# Patient Record
Sex: FEMALE | Race: WHITE
[De-identification: names, ages, dates, MRNs, and addresses within clinical notes are randomized per-mention and may not be internally consistent; named-entity substitution may affect disease eponyms.]

---

## 2019-09-28 NOTE — ADMISSION PHYSICAL
=================================================================



=================================================================

Datetime Report Generated by CPN: 2019 15:56

   

   

=================================================================

CURRENT ADMISSION

=================================================================

   

Chief Complaint:  Uterine Contractions

Indication for Induction:  Not Applicable

Admit Impression :  Term, Intrauterine Pregnancy; Active Labor

Admit Plan:  Admit to Unit; Initiate Labor Protocol

   

=================================================================

ALLERGIES

=================================================================

   

Medication Allergies:  No

Medication Allergies:  shellfish derived/SV/Anaphylaxis (2019)

Latex:  No Latex Allergies

Food Allergies:  Shellfish--anaphylaxis

Environmental Allergies:  Seasonal

   

=================================================================

OBSTETRICAL HISTORY

=================================================================

   

EDC:  10/07/2019 00:00

:  1

Para:  0

Term:  0

:  0

SAB:  0

IAB:  0

Ectopic:  0

Livin

Cesareans:  0

VBACs:  0

Multiple Births:  0

Gestational Diabetes:  No

Rh Sensitization:  No

Incompetent Cervix:  No

CHRISTEN:  No

Infertility:  No

ART Treatment:  No

Uterine Anomaly:  No

IUGR:  No

Hx Previous C/S:  No

Macrosomia:  No

Hx Loss/Stillborn:  No

PIH:  No

Hx  Death:  No

Placenta Previa/Abruption:  No

Depression/PP Depression:  No

PTL/PROM:  No

Post Partum Hemorrhage:  No

Current Pregnancy Procedures:  Ultrasound

Obstetrical History Comments:  G1 current

   

=================================================================

***SEE PRENATAL RECORDS***

=================================================================

   

Alcohol:  No

Marijuana :  No

Cocaine:  No

Other Illicit Drugs:  No

Cigarettes:  Never Smoker. [id number]

   

=================================================================

MEDICAL HISTORY

=================================================================

   

Diabetes:  No

Blood Transfusion:  No

Pulmonary Disease (Asthma, TB):  Yes

Breast Disease:  No

Hypertension:  No

Gyn Surgery:  No

Heart Disease:  No

Hosp/Surgery:  No

Autoimmune Disorder:  No

Anesthetic Complications:  No

Kidney Disease:  No

Abnormal Pap Smear:  No

Neuro/Epilepsy:  No

Psychiatric Disorders:  No

Other Medical Diseases:  No

Hepatitis/Liver Disease:  No

Significant Family History:  No

Varicosities/Phlebitis:  No

Trauma/Violence :  No

Thyroid Dysfunction:  No

Medical History Comments:  pt with R hip abnormality

   

=================================================================

INFECTIOUS HISTORY

=================================================================

   

Gonorrhea:  No

Genital Herpes:  No

Chlamydia:  No

Tuberculosis:  No

Syphilis:  No

Hepatitis:  No

HIV/AIDS Exposure:  No

Rash or Viral Illness:  No

HPV:  No

   

=================================================================

PHYSICAL EXAM

=================================================================

   

General:  Normal

HEENT:  Normal

Neurologic:  Normal

Thyroid:  Normal

Heart:  Normal

Lungs:  Normal

Breast:  Deferred

Back:  Normal

Abdomen:  Normal

Genitourinary Exam:  Normal

Extremities:  Normal

DTRs:  Normal

Pelvic Type:  Adequate

Vital Signs:  Reviewed

   

=================================================================

VAGINAL EXAM

=================================================================

   

Dilatation:  4

Effacement:  90

Station:  -1

   

=================================================================

MEMBRANES

=================================================================

   

Membranes:  Intact

   

=================================================================

FETUS A

=================================================================

   

EGA:  38.5

Monitoring:  External US

FHR- Baseline:  120

Variability:  Moderate 6-25bpm

Decelerations:  None

FHR Category:  Category I

Admit Comment:  admit and may have epidural

   

=================================================================

INFORMED CONSENT

=================================================================

   

Signature:  Electronically signed by Fanny Eastman MD ([facility]IDA) on

   2019 at 15:55  with User ID: [email or web address]

## 2019-09-29 NOTE — DELIVERY SUMMARY
=================================================================

Del Sum A-C

=================================================================

Datetime Report Generated by CPN: 2019 00:22

   

   

=================================================================

DELIVERY PERSONNEL

=================================================================

   

DELIVERY PERSONNEL:  [id number]

Delivery Doctor::  Fanny Eastman MD

Labor and Delivery Nurse::  Iram Petty, RN

Labor and Delivery Nurse::  Daniela Fox, SABI

   

=================================================================

MATERNAL INFORMATION

=================================================================

   

Delivery Anesthesia:  Epidural

Medications After Delivery:  Pitocin Drip 20 Units/1000ml NSS; Cytotec

   1000mcg Per Rectum/Vagina

Delivery QBL:  50

Maternal Complications:  None

   

=================================================================

LABOR SUMMARY

=================================================================

   

EDC:  10/07/2019 00:00

No. Babies in Womb:  1

 Attempted:  No

Labor Anesthesia:  Epidural

   

=================================================================

LABOR INFORMATION

=================================================================

   

Reason for Induction:  Not Applicable

Onset of Labor:  2019 11:30

Complete Dilatation:  2019 20:49

Oxytocin:  N/A

Group B Beta Strep:  negative

Antibiotics # of Doses:  0

Antibiotics Time of Last Dose:  N/A

Name of Antibiotic Given:  N/A

Steroids Given:  None

Reason Steroids Not Administered:  Not Applicable

   

=================================================================

MEMBRANES

=================================================================

   

Membranes Rupture Method:  Artificial

Rupture of Membranes:  2019 18:02

Length of Rupture (hr):  3.08

Amniotic Fluid Color:  Clear

Amniotic Fluid Amount:  Large

Amniotic Fluid Odor:  Normal

   

=================================================================

STAGES OF LABOR

=================================================================

   

Stage 1 hr:  9

Stage 1 min:  19

Stage 2 hr:  0

Stage 2 min:  18

Stage 3 hr:  0

Stage 3 min:  10

Total Time in Labor hr:  9

Total Time in Labor min:  47

   

=================================================================

VAGINAL DELIVERY

=================================================================

   

Episiotomy:  None

Laceration #1:  Vaginal

Laceration Extension #1:  First Degree

Laceration #2:  Periurethral

Laceration Extension #2:  First Degree

Laceration #3:  None

Laceration Extension #3:  N/A

Laceration Repair:  Yes

Laceration Repair Note:  a small vaginal  lac and small perineal lac

   repaired with one 3-0 chromic suture

Sponge Count Correct:  N/A

Sharps Count Correct:  N/A

   

=================================================================

CSECTION DELIVERY

=================================================================

   

Primary Indication:  N/A

Secondary Indication:  N/A

CSection Incidence:  N/A

Labor:  N/A

Elective:  N/A

   

=================================================================

BABY A INFORMATION

=================================================================

   

Infant Delivery Date/Time:  2019 21:07

Method of Delivery:  Vaginal

Born in Route :  No

:  N/A

Forceps:  N/A

Vacuum Extraction:  N/A

Shoulder Dystocia :  No

   

=================================================================

PRESENTATION/POSITION BABY A

=================================================================

   

Presentation:  Cephalic

Cephalic Presentation:  Vertex

Vertex Position:  Right Occipital Anterior

Breech Presentation:  N/A

   

=================================================================

PLACENTA INFORMATION BABY A

=================================================================

   

Placenta Delivery Time :  2019 21:17

Placenta Method of Delivery:  Spontaneous

Placenta Status:  Delivered

   

=================================================================

APGAR SCORES BABY A

=================================================================

   

Heart Rate 1 min:  >100 bpm

Resp Effort 1 min:  Good Cry

Reflex Irritability 1 min:  Cough or Sneeze or Pulls Away

Muscle Tone 1 min:  Active Motion

Color 1 min:  Blue/Pale

APGAR SCORE 1 MIN:  8

Heart Rate 5 min:  >100 bpm

Resp Effort 5 min:  Good Cry

Reflex Irritability 5 min:  Cough or Sneeze or Pulls Away

Muscle Tone 5 min:  Active Motion

Color 5 min:  Body Pink, Extremities Blue

Resuscitation Effort 5 min:  Tactile Stimulation

APGAR SCORE 5 MIN:  9

   

=================================================================

INFANT INFORMATION BABY A

=================================================================

   

Gestational Age at Delivery:  38.5

Gestational Status:  Early Term- 37- 38.6 Weeks

Infant Outcome :  Liveborn

Infant Condition :  Stable

Infant Sex:  Female

   

=================================================================

IDENTIFICATION BABY A

=================================================================

   

ID Band Number:  [id number]

Mother's Name Verified:  Yes

Infant Medical Record Number:  [id number]

RN Verifying Infant:  CHRISS foxShaina RN

Additional Verifying Personnel:  SUSI Orta RN

   

=================================================================

WEIGHT/LENGTH BABY A

=================================================================

   

Infant Birthweight (gm):  3175

Infant Weight (lb):  7

Infant Weight (oz):  0

Infant Length (in):  19.00

Infant Length (cm):  48.26

   

=================================================================

CORD INFORMATION BABY A

=================================================================

   

No. Cord Vessels:  3

Nuchal Cord :  N/A

Cord Blood Taken:  Yes-For Storage (Mom's Blood type +)

Infant Suction:  Mouth; Nose

   

=================================================================

ASSESSMENT BABY A

=================================================================

   

Infant Complications:  None

Infant Complications- Other:  Left compound hand/arm

Physical Findings at Delivery:  Within Normal Limits

Physical Findings- Other:  see initial nursery assessment

Infant Respirations:  Appears Normal

Skin to Skin:  Yes

Skin to Skin Time (min):  60

Neonatologist/ALS Called :  No

Infant Care By:  NUSRAT Fox RN

Transferred To:  Remains with Mother

   

=================================================================

BABY B INFORMATION

=================================================================

   

 :  N/A

   

=================================================================

SIGNATURES

=================================================================

   

Signature:  Electronically signed by Damain Eastman, MD ([facility]IDA) on

   2019 at 21:41  with User ID: [id number]

## 2019-09-29 NOTE — PDOC PROGRESS REPORT
Subjective-OB


Progress Note for:: 09/29/19


Subjective: 





Doing well, no c/o, ambulating, bonding with baby





Physical Exam (OB)


Vital Signs: 


                                        











Temp Pulse Resp BP Pulse Ox


 


 98.2 F   69   16   121/78   98 


 


 09/29/19 07:26  09/29/19 07:26  09/29/19 07:26  09/29/19 07:26  09/29/19 07:26








                                 Intake & Output











 09/28/19 09/29/19 09/30/19





 06:59 06:59 06:59


 


Weight  87.8 kg 














- PIH/Pre-Eclampsia


Clonus: Negative


Headache: Absent


Epigastric Pain: No


Visual Changes: No





- Lochia


Lochia Amount: Small 10-25 ml


Lochia Color: Rubra/Red





- Abdomen


Description: Soft


Hernia Present: No


Fundal Description: Firm


Fundal Height: u/u - u/2





Objective-Diagnostic


Laboratory: 


                                        





                                 09/29/19 05:43 





                                        











  09/28/19 09/28/19 09/28/19





  13:50 14:43 14:43


 


WBC   8.3 


 


RBC   4.21 


 


Hgb   13.3 


 


Hct   38.0 


 


MCV   90 


 


MCH   31.5 


 


MCHC   34.9 


 


RDW   13.3 


 


Plt Count   144 L 


 


Seg Neutrophils %   74.7 


 


Urine Color  RED  


 


Urine Appearance  CLOUDY  


 


Urine pH  6.0  


 


Ur Specific Gravity  1.019  


 


Urine Protein  100 H  


 


Urine Glucose (UA)  50 H  


 


Urine Ketones  TRACE H  


 


Urine Blood  LARGE H  


 


Urine Nitrite  NEGATIVE  


 


Ur Leukocyte Esterase  NEGATIVE  


 


Blood Type    A POSITIVE


 


Antibody Screen    NEGATIVE














  09/29/19





  05:43


 


WBC  10.9 H


 


RBC  4.06


 


Hgb  12.8


 


Hct  36.8


 


MCV  91


 


MCH  31.6


 


MCHC  34.8


 


RDW  13.8


 


Plt Count  134 L


 


Seg Neutrophils % 


 


Urine Color 


 


Urine Appearance 


 


Urine pH 


 


Ur Specific Gravity 


 


Urine Protein 


 


Urine Glucose (UA) 


 


Urine Ketones 


 


Urine Blood 


 


Urine Nitrite 


 


Ur Leukocyte Esterase 


 


Blood Type 


 


Antibody Screen 














Assessment and Plan(PN)





- Assessment and Plan


(1) Delivery normal


Is this a current diagnosis for this admission?: Yes   





- Time Spent with Patient


Time with patient: Less than 15 minutes


Medications reviewed and adjusted accordingly: Yes





- Disposition


Anticipated Discharge: Home


Within: within 24 hours

## 2019-09-30 NOTE — PDOC DISCHARGE SUMMARY
Final Diagnosis


Discharge Date: 09/30/19 - PP Day #2, doing well, no complaints, A+, Rubella 

Non-Immune, breastfeeding. 





- Final Diagnosis


(1) Normal postpartum course


Is this a current diagnosis for this admission?: Yes   





(2) Delivery normal


Is this a current diagnosis for this admission?: Yes   





Discharge Data





- Discharge Medication


Prescriptions: 


Ibuprofen [Motrin 800 mg Tablet] 800 mg PO Q8 #60 tablet


Home Medications: 








Prenatal Vitamin [Prenatal-U Multiple Vitamin Capsule] 1 tab PO DAILY 09/28/19 


Ibuprofen [Motrin 800 mg Tablet] 800 mg PO Q8 #60 tablet 09/30/19 








Reason(s) for Admission: Onset of Labor


Prenatal Procedures: Ultrasound


Intrapartum Procedure(s): Spontaneous Vaginal Delivery


Postpartum Complication(s): Laceration-Perineal


Laceration-Degree: 1st





- Diagnosis Test


Laboratory: 


                                        











Temp Pulse Resp BP Pulse Ox


 


 98.2 F   70   14   119/74   100 


 


 09/30/19 07:53  09/30/19 07:53  09/30/19 07:53  09/30/19 07:53  09/30/19 07:53








                                        











  09/28/19 09/28/19 09/29/19





  13:50 14:43 05:43


 


RBC   4.21  4.06


 


Hgb   13.3  12.8


 


Hct   38.0  36.8


 


Urine Opiates Screen  NEGATIVE  














- Discharge information/Instructions


Discharge Activity: Activity As Tolerated, No Lifting Over 10 Pounds, Pelvic 

Rest


Discharge Diet: As Tolerated, Regular


Disposition: HOME, SELF-CARE


Follow up with: [facility]


in: 4, Weeks

## 2020-01-13 LAB
APPEARANCE UR: CLEAR
APTT PPP: YELLOW S
BILIRUB UR QL STRIP: NEGATIVE
ERYTHROCYTE [DISTWIDTH] IN BLOOD BY AUTOMATED COUNT: 12.7 % (ref 11.5–14)
GLUCOSE UR STRIP-MCNC: NEGATIVE MG/DL
HCT VFR BLD CALC: 42.1 % (ref 36–47)
HGB BLD-MCNC: 14.6 G/DL (ref 12–15.5)
KETONES UR STRIP-MCNC: NEGATIVE MG/DL
MCH RBC QN AUTO: 30.5 PG (ref 27–33.4)
MCHC RBC AUTO-ENTMCNC: 34.6 G/DL (ref 32–36)
MCV RBC AUTO: 88 FL (ref 80–97)
NITRITE UR QL STRIP: NEGATIVE
PH UR STRIP: 6 [PH] (ref 5–9)
PLATELET # BLD: 190 10^3/UL (ref 150–450)
PROT UR STRIP-MCNC: NEGATIVE MG/DL
RBC # BLD AUTO: 4.78 10^6/UL (ref 3.72–5.28)
SP GR UR STRIP: 1.01
UROBILINOGEN UR-MCNC: NEGATIVE MG/DL (ref ?–2)
WBC # BLD AUTO: 5.8 10^3/UL (ref 4–10.5)

## 2020-01-14 ENCOUNTER — HOSPITAL ENCOUNTER (OUTPATIENT)
Dept: HOSPITAL 62 - OROUT | Age: 30
Discharge: HOME | End: 2020-01-14
Attending: OBSTETRICS & GYNECOLOGY
Payer: COMMERCIAL

## 2020-01-14 VITALS — SYSTOLIC BLOOD PRESSURE: 111 MMHG | DIASTOLIC BLOOD PRESSURE: 80 MMHG

## 2020-01-14 DIAGNOSIS — Z79.51: ICD-10-CM

## 2020-01-14 DIAGNOSIS — J45.909: ICD-10-CM

## 2020-01-14 DIAGNOSIS — N83.202: Primary | ICD-10-CM

## 2020-01-14 PROCEDURE — 81025 URINE PREGNANCY TEST: CPT

## 2020-01-14 PROCEDURE — 36415 COLL VENOUS BLD VENIPUNCTURE: CPT

## 2020-01-14 PROCEDURE — 85027 COMPLETE CBC AUTOMATED: CPT

## 2020-01-14 PROCEDURE — 49322 LAPAROSCOPY ASPIRATION: CPT

## 2020-01-14 PROCEDURE — 81005 URINALYSIS: CPT

## 2020-01-14 PROCEDURE — 00840 ANES IPER PX LOWER ABD NOS: CPT

## 2020-01-14 PROCEDURE — 88305 TISSUE EXAM BY PATHOLOGIST: CPT

## 2020-01-14 RX ADMIN — FENTANYL CITRATE ONE MCG: 50 INJECTION INTRAMUSCULAR; INTRAVENOUS at 12:30

## 2020-01-14 RX ADMIN — FENTANYL CITRATE ONE MCG: 50 INJECTION INTRAMUSCULAR; INTRAVENOUS at 12:25

## 2020-01-14 NOTE — OPERATIVE REPORT
Operative Report


DATE OF SURGERY: 01/14/20


PREOPERATIVE DIAGNOSIS: Left ovarian cyst which is simple in appearance but lar

ge enough to be bothering the patient.


POSTOPERATIVE DIAGNOSIS: Same


OPERATION: Laparoscopy with drainage of the left ovarian cyst


SURGEON: EDGAR CASAS


ANESTHESIA: GA


TISSUE REMOVED OR ALTERED: Left ovary


COMPLICATIONS: 





None


ESTIMATED BLOOD LOSS: 10 cc


INTRAOPERATIVE FINDINGS: Large simple appearing left ovarian cyst.  There is a 

normal right ovary.


PROCEDURE: 





Patient was taken the OR and placed in supine position.  General anesthesia was 

induced.  She was placed in a supine position her abdomen was prepared and 

draped in a sterile fashion.  She had voided prior to the procedure and did not 

need catheterization.  An incision was made at the umbilicus and natural 

umbilical defect was dilated with Bernadine clamp allowing a blunt port to be 

placed.  Laparoscopy confirmed appropriate placement.  A suprapubic port was 

placed under laparoscopic visualization and a left lateral port placed under 

laparoscopic visualization.  Left ovary was easily visible and enlarged.  It was

elevated up out of the pelvis with a grasper and an incision was made with 

cautery and kelly which drained the ovarian cyst.  Straw-colored clear fluid 

was drained out.  Using the LigaSure device a large section of the cyst wall was

removed and sent for specimen.  The internal cyst wall was very smooth.  The 

fluid was suctioned out of the pelvis using the  aspirator.  Hemostasis

was quite good.  Next the ports were removed under laparoscopic visualization.  

The gas was allowed to escape from the abdomen.  The umbilical port and scope 

were removed at the same time.  All 3 port sites were closed at the skin with a 

4-0 undyed Vicryl stitch.  She was extubated in the OR and taken recovery room 

in stable condition.

## 2020-01-14 NOTE — DISCHARGE SUMMARY
Discharge Summary (SDC)





- Discharge


Final Diagnosis: 





Simple left ovarian cyst.


Date of Surgery: 01/14/20


Discharge Date: 01/14/20


Condition: Good


Prescriptions: 


Oxycodone HCl/Acetaminophen [Percocet 5-325 mg Tablet] 1 tab PO Q4HP PRN #20 

tablet


 PRN Reason: 


Ibuprofen [Motrin 800 mg Tablet] 800 mg PO BIDP PRN #30 tablet


 PRN Reason: 


Discharge Diet: Regular


Discharge Activity: Pelvic Rest, Slowly Increase Activity


Report the Following to Your Physician Immediately: Shortness of Breath, Fever 

over 101 Degrees

## 2020-10-16 ENCOUNTER — HISTORICAL (OUTPATIENT)
Dept: ADMINISTRATIVE | Facility: HOSPITAL | Age: 30
End: 2020-10-16

## 2021-09-16 ENCOUNTER — HISTORICAL (OUTPATIENT)
Dept: ADMINISTRATIVE | Facility: HOSPITAL | Age: 31
End: 2021-09-16

## 2022-04-10 ENCOUNTER — HISTORICAL (OUTPATIENT)
Dept: ADMINISTRATIVE | Facility: HOSPITAL | Age: 32
End: 2022-04-10

## 2022-04-29 VITALS
BODY MASS INDEX: 26.61 KG/M2 | WEIGHT: 155.88 LBS | HEIGHT: 64 IN | SYSTOLIC BLOOD PRESSURE: 122 MMHG | DIASTOLIC BLOOD PRESSURE: 76 MMHG

## 2025-01-17 ENCOUNTER — HOSPITAL ENCOUNTER (EMERGENCY)
Facility: HOSPITAL | Age: 35
Discharge: HOME | End: 2025-01-17
Attending: INTERNAL MEDICINE

## 2025-01-17 VITALS
TEMPERATURE: 100 F | HEART RATE: 85 BPM | BODY MASS INDEX: 23.05 KG/M2 | RESPIRATION RATE: 20 BRPM | OXYGEN SATURATION: 100 % | SYSTOLIC BLOOD PRESSURE: 96 MMHG | DIASTOLIC BLOOD PRESSURE: 57 MMHG | WEIGHT: 135 LBS | HEIGHT: 64 IN

## 2025-01-17 DIAGNOSIS — N39.0 URINARY TRACT INFECTION WITHOUT HEMATURIA, SITE UNSPECIFIED: ICD-10-CM

## 2025-01-17 DIAGNOSIS — N30.00 ACUTE CYSTITIS WITHOUT HEMATURIA: Primary | ICD-10-CM

## 2025-01-17 LAB
ABS NEUT CALC (OHS): 16.09 X10(3)/MCL (ref 2.1–9.2)
ALBUMIN SERPL-MCNC: 4.1 G/DL (ref 3.4–5)
ALBUMIN/GLOB SERPL: 1.1 RATIO
ALP SERPL-CCNC: 76 UNIT/L (ref 50–144)
ALT SERPL-CCNC: 15 UNIT/L (ref 1–45)
ANION GAP SERPL CALC-SCNC: 9 MEQ/L (ref 2–13)
AST SERPL-CCNC: 22 UNIT/L (ref 14–36)
B-HCG UR QL: NEGATIVE
B-OH-BUTYR SERPL-MCNC: 0.9 MMOL/L
BACTERIA #/AREA URNS AUTO: ABNORMAL /HPF
BILIRUB SERPL-MCNC: 0.9 MG/DL (ref 0–1)
BILIRUB UR QL STRIP.AUTO: NEGATIVE
BUN SERPL-MCNC: 15 MG/DL (ref 7–20)
CALCIUM SERPL-MCNC: 8.9 MG/DL (ref 8.4–10.2)
CHLORIDE SERPL-SCNC: 102 MMOL/L (ref 98–110)
CLARITY UR: ABNORMAL
CO2 SERPL-SCNC: 20 MMOL/L (ref 21–32)
COLOR UR AUTO: YELLOW
CREAT SERPL-MCNC: 0.81 MG/DL (ref 0.66–1.25)
CREAT/UREA NIT SERPL: 19 (ref 12–20)
ERYTHROCYTE [DISTWIDTH] IN BLOOD BY AUTOMATED COUNT: 12.7 % (ref 11–14.5)
GFR SERPLBLD CREATININE-BSD FMLA CKD-EPI: >90 ML/MIN/1.73/M2
GLOBULIN SER-MCNC: 3.6 GM/DL (ref 2–3.9)
GLUCOSE SERPL-MCNC: 115 MG/DL (ref 70–115)
GLUCOSE UR QL STRIP: NEGATIVE
HCT VFR BLD AUTO: 34.1 % (ref 36–48)
HGB BLD-MCNC: 11.7 G/DL (ref 11.8–16)
HGB UR QL STRIP: ABNORMAL
INFLUENZA A (OHS): NEGATIVE
INFLUENZA B (OHS): NEGATIVE
KETONES UR QL STRIP: >=80
LACTATE SERPL-SCNC: 0.5 MMOL/L (ref 0.4–2)
LEUKOCYTE ESTERASE UR QL STRIP: ABNORMAL
LIPASE SERPL-CCNC: 56 U/L (ref 23–300)
LYMPHOCYTES NFR BLD MANUAL: 0.91 X10(3)/MCL
LYMPHOCYTES NFR BLD MANUAL: 5 % (ref 20–55)
MCH RBC QN AUTO: 29.5 PG (ref 27–34)
MCHC RBC AUTO-ENTMCNC: 34.3 G/DL (ref 31–37)
MCV RBC AUTO: 85.9 FL (ref 79–99)
MONOCYTES NFR BLD MANUAL: 1.28 X10(3)/MCL (ref 0.1–1.3)
MONOCYTES NFR BLD MANUAL: 7 % (ref 0–10)
NEUTROPHILS NFR BLD MANUAL: 80 % (ref 37–73)
NEUTS BAND NFR BLD MANUAL: 8 % (ref 0–5)
NITRITE UR QL STRIP: NEGATIVE
NRBC BLD AUTO-RTO: 0 %
PH UR STRIP: 6 [PH]
PLATELET # BLD AUTO: 202 X10(3)/MCL (ref 140–371)
PLATELET # BLD EST: ADEQUATE 10*3/UL
PMV BLD AUTO: 9.2 FL (ref 9.4–12.4)
POTASSIUM SERPL-SCNC: 3.8 MMOL/L (ref 3.5–5.1)
PROT SERPL-MCNC: 7.7 GM/DL (ref 6.3–8.2)
PROT UR QL STRIP: 30
RAPID GROUP A STREP (OHS): NEGATIVE
RBC # BLD AUTO: 3.97 X10(6)/MCL (ref 4–5.1)
RBC #/AREA URNS AUTO: ABNORMAL /HPF
RBC MORPH BLD: NORMAL
SARS-COV-2 RDRP RESP QL NAA+PROBE: NEGATIVE
SODIUM SERPL-SCNC: 131 MMOL/L (ref 136–145)
SP GR UR STRIP.AUTO: 1.02 (ref 1–1.03)
SQUAMOUS #/AREA URNS AUTO: ABNORMAL /HPF
UROBILINOGEN UR STRIP-ACNC: 0.2
WBC # BLD AUTO: 18.28 X10(3)/MCL (ref 4–11.5)
WBC #/AREA URNS AUTO: ABNORMAL /HPF

## 2025-01-17 PROCEDURE — 96375 TX/PRO/DX INJ NEW DRUG ADDON: CPT

## 2025-01-17 PROCEDURE — 87040 BLOOD CULTURE FOR BACTERIA: CPT | Performed by: INTERNAL MEDICINE

## 2025-01-17 PROCEDURE — 83690 ASSAY OF LIPASE: CPT | Performed by: INTERNAL MEDICINE

## 2025-01-17 PROCEDURE — 25000003 PHARM REV CODE 250: Performed by: INTERNAL MEDICINE

## 2025-01-17 PROCEDURE — 82010 KETONE BODYS QUAN: CPT | Performed by: INTERNAL MEDICINE

## 2025-01-17 PROCEDURE — 81025 URINE PREGNANCY TEST: CPT | Performed by: INTERNAL MEDICINE

## 2025-01-17 PROCEDURE — 96374 THER/PROPH/DIAG INJ IV PUSH: CPT

## 2025-01-17 PROCEDURE — 81003 URINALYSIS AUTO W/O SCOPE: CPT | Performed by: INTERNAL MEDICINE

## 2025-01-17 PROCEDURE — 63600175 PHARM REV CODE 636 W HCPCS: Mod: JZ,TB | Performed by: INTERNAL MEDICINE

## 2025-01-17 PROCEDURE — 80053 COMPREHEN METABOLIC PANEL: CPT | Performed by: INTERNAL MEDICINE

## 2025-01-17 PROCEDURE — 81015 MICROSCOPIC EXAM OF URINE: CPT | Performed by: INTERNAL MEDICINE

## 2025-01-17 PROCEDURE — 87086 URINE CULTURE/COLONY COUNT: CPT | Performed by: INTERNAL MEDICINE

## 2025-01-17 PROCEDURE — 87400 INFLUENZA A/B EACH AG IA: CPT | Performed by: INTERNAL MEDICINE

## 2025-01-17 PROCEDURE — 85025 COMPLETE CBC W/AUTO DIFF WBC: CPT | Performed by: INTERNAL MEDICINE

## 2025-01-17 PROCEDURE — 96361 HYDRATE IV INFUSION ADD-ON: CPT

## 2025-01-17 PROCEDURE — 83605 ASSAY OF LACTIC ACID: CPT | Performed by: INTERNAL MEDICINE

## 2025-01-17 PROCEDURE — 99285 EMERGENCY DEPT VISIT HI MDM: CPT | Mod: 25

## 2025-01-17 PROCEDURE — 87651 STREP A DNA AMP PROBE: CPT | Performed by: INTERNAL MEDICINE

## 2025-01-17 PROCEDURE — U0002 COVID-19 LAB TEST NON-CDC: HCPCS | Performed by: INTERNAL MEDICINE

## 2025-01-17 RX ORDER — LEVOFLOXACIN 750 MG/1
750 TABLET ORAL DAILY
Qty: 7 TABLET | Refills: 0 | Status: SHIPPED | OUTPATIENT
Start: 2025-01-17

## 2025-01-17 RX ORDER — PHENAZOPYRIDINE HYDROCHLORIDE 100 MG/1
100 TABLET, FILM COATED ORAL
Status: COMPLETED | OUTPATIENT
Start: 2025-01-17 | End: 2025-01-17

## 2025-01-17 RX ORDER — FAMOTIDINE 10 MG/ML
20 INJECTION INTRAVENOUS
Status: COMPLETED | OUTPATIENT
Start: 2025-01-17 | End: 2025-01-17

## 2025-01-17 RX ORDER — ONDANSETRON HYDROCHLORIDE 2 MG/ML
4 INJECTION, SOLUTION INTRAVENOUS
Status: COMPLETED | OUTPATIENT
Start: 2025-01-17 | End: 2025-01-17

## 2025-01-17 RX ORDER — PHENAZOPYRIDINE HYDROCHLORIDE 100 MG/1
100 TABLET, FILM COATED ORAL 2 TIMES DAILY
Qty: 6 TABLET | Refills: 0 | Status: SHIPPED | OUTPATIENT
Start: 2025-01-17 | End: 2025-01-27

## 2025-01-17 RX ORDER — CEFTRIAXONE 1 G/1
1 INJECTION, POWDER, FOR SOLUTION INTRAMUSCULAR; INTRAVENOUS
Status: COMPLETED | OUTPATIENT
Start: 2025-01-17 | End: 2025-01-17

## 2025-01-17 RX ORDER — KETOROLAC TROMETHAMINE 30 MG/ML
30 INJECTION, SOLUTION INTRAMUSCULAR; INTRAVENOUS
Status: COMPLETED | OUTPATIENT
Start: 2025-01-17 | End: 2025-01-17

## 2025-01-17 RX ORDER — KETOROLAC TROMETHAMINE 10 MG/1
10 TABLET, FILM COATED ORAL EVERY 6 HOURS
Qty: 20 TABLET | Refills: 0 | Status: SHIPPED | OUTPATIENT
Start: 2025-01-17 | End: 2025-01-22

## 2025-01-17 RX ADMIN — SODIUM CHLORIDE 1000 ML: 9 INJECTION, SOLUTION INTRAVENOUS at 09:01

## 2025-01-17 RX ADMIN — CEFTRIAXONE SODIUM 1 G: 1 INJECTION, POWDER, FOR SOLUTION INTRAMUSCULAR; INTRAVENOUS at 09:01

## 2025-01-17 RX ADMIN — FAMOTIDINE 20 MG: 10 INJECTION, SOLUTION INTRAVENOUS at 07:01

## 2025-01-17 RX ADMIN — KETOROLAC TROMETHAMINE 30 MG: 30 INJECTION, SOLUTION INTRAMUSCULAR at 07:01

## 2025-01-17 RX ADMIN — SODIUM CHLORIDE 1000 ML: 9 INJECTION, SOLUTION INTRAVENOUS at 07:01

## 2025-01-17 RX ADMIN — ONDANSETRON 4 MG: 2 INJECTION INTRAMUSCULAR; INTRAVENOUS at 07:01

## 2025-01-17 RX ADMIN — PHENAZOPYRIDINE 100 MG: 100 TABLET ORAL at 10:01

## 2025-01-17 NOTE — ED PROVIDER NOTES
Encounter Date: 1/17/2025       History     Chief Complaint   Patient presents with    Abdominal Pain    Back Pain    Dysuria     X 3 DAYS. TAKING OTC CYSTEX AND MOTRIN. GENERALIZED PAIN ALL OVER     This is a 34-year-old female patient came into the emergency room with history of having generalized body pains and more so in the left flank intermittently for last 3 days.  Patient reports pain radiating into the left upper quadrant area of the abdomen.  Reports discomfort in urination.  Reports nausea but no vomitings.  Denies any cough, congestion.  No shortness of breath or chest pain.  Reports having pinkish urine.  Denies any shortness of breath or palpitations.        Review of patient's allergies indicates:  No Known Allergies  History reviewed. No pertinent past medical history.  History reviewed. No pertinent surgical history.  No family history on file.  Social History     Tobacco Use    Smoking status: Never    Smokeless tobacco: Never   Substance Use Topics    Alcohol use: Not Currently    Drug use: Never     Review of Systems   Constitutional:  Positive for fatigue and fever.   HENT:  Positive for nosebleeds. Negative for ear pain and hearing loss.    Eyes: Negative.    Respiratory:  Negative for cough, shortness of breath, wheezing and stridor.    Cardiovascular:  Negative for chest pain and palpitations.   Gastrointestinal:  Positive for abdominal pain and nausea. Negative for vomiting.   Genitourinary:  Positive for dysuria and flank pain.        Left flank pain reported radiating to left upper quadrant of abdomen.   Musculoskeletal:  Positive for back pain and myalgias.   Skin:  Positive for rash.   Allergic/Immunologic: Negative.    Neurological: Negative.  Negative for seizures, light-headedness, numbness and headaches.   Psychiatric/Behavioral: Negative.  Negative for confusion.    All other systems reviewed and are negative.      Physical Exam     Initial Vitals [01/17/25 0612]   BP Pulse Resp  Temp SpO2   124/72 (!) 118 (!) 22 100 °F (37.8 °C) 100 %      MAP       --         Physical Exam    Nursing note and vitals reviewed.  Constitutional: She appears well-developed and well-nourished.   HENT:   Head: Normocephalic and atraumatic.   Eyes: EOM are normal.   Neck: Neck supple.   Normal range of motion.  Cardiovascular:  Normal rate, regular rhythm, normal heart sounds and intact distal pulses.           Pulmonary/Chest: Breath sounds normal.   Abdominal: Abdomen is soft. Bowel sounds are normal. There is abdominal tenderness.   Left upper and left lower quadrant tenderness noted.  Left CVA tenderness noted.   Musculoskeletal:         General: Normal range of motion.      Cervical back: Normal range of motion and neck supple.     Neurological: She is alert and oriented to person, place, and time. GCS score is 15. GCS eye subscore is 4. GCS verbal subscore is 5. GCS motor subscore is 6.   Skin: Skin is warm and dry. Capillary refill takes less than 2 seconds.   Psychiatric: She has a normal mood and affect.         ED Course   Procedures  Labs Reviewed   URINALYSIS - Abnormal       Result Value    Color, UA Yellow      Appearance, UA Cloudy (*)     Specific Gravity, UA 1.020      pH, UA 6.0      Protein, UA 30 (*)     Glucose, UA Negative      Ketones, UA >=80 (*)     Blood, UA Moderate (*)     Bilirubin, UA Negative      Urobilinogen, UA 0.2      Nitrites, UA Negative      Leukocyte Esterase, UA Moderate (*)     Narrative:      URINE STABILITY IS 2 HOURS AT ROOM TEMP OR    SIX HOURS REFRIGERATED. PERFORMING TESTING ON    SPECIMENS GREATER THAN THIS AGE MAY AFFECT THE    FOLLOWING TESTS:    PH          SPECIFIC GRAVITY           BLOOD    CLARITY     BILIRUBIN               UROBILINOGEN   URINALYSIS, MICROSCOPIC - Abnormal    Bacteria, UA Few (*)     RBC, UA 0-5      WBC, UA 50-99 (*)     Squamous Epithelial Cells, UA Few (*)    COMPREHENSIVE METABOLIC PANEL - Abnormal    Sodium 131 (*)     Potassium 3.8       Chloride 102      CO2 20 (*)     Glucose 115      Blood Urea Nitrogen 15      Creatinine 0.81      Calcium 8.9      Protein Total 7.7      Albumin 4.1      Globulin 3.6      Albumin/Globulin Ratio 1.1      Bilirubin Total 0.9      ALP 76      ALT 15      AST 22      eGFR >90      Anion Gap 9.0      BUN/Creatinine Ratio 19     CBC WITH DIFFERENTIAL - Abnormal    WBC 18.28 (*)     RBC 3.97 (*)     Hgb 11.7 (*)     Hct 34.1 (*)     MCV 85.9      MCH 29.5      MCHC 34.3      RDW 12.7      Platelet 202      MPV 9.2 (*)     NRBC% 0.0     MANUAL DIFFERENTIAL - Abnormal    Neutrophils % 80 (*)     Bands % 8 (*)     Lymphs % 5 (*)     Monocytes % 7      Neutrophils Abs Calc 16.0864 (*)     Lymphs Abs 0.914      Monocytes Abs 1.2796      Platelets Adequate      RBC Morph Normal     RAPID INFLUENZA A/B - Normal    Influenza A Negative      Influenza B Negative     THROAT SCREEN, RAPID STREP - Normal    Rapid Group A Strep Negative     HCG QUALITATIVE URINE - Normal    hCG Qualitative, Urine Negative     LIPASE - Normal    Lipase Level 56     SARS-COV-2 RNA AMPLIFICATION, QUAL - Normal    SARS COV-2 Molecular Negative      Narrative:     The IDNOW COVID-19 assay is a rapid molecular in vitro diagnostic test utilizing an isothermal nucleic acid amplification technology intended for the qualitative detection of nucleic acid from the SARS-CoV-2 viral RNA in direct nasal, nasopharyngeal or throat swabs from individuals who are suspected of COVID-19 by their healthcare provider.   LACTIC ACID, PLASMA - Normal    Lactic Acid Level 0.5     CULTURE, URINE   BLOOD CULTURE [facility]   BLOOD CULTURE [facility]   CBC W/ AUTO DIFFERENTIAL    Narrative:     The following orders were created for panel order CBC W/ AUTO DIFFERENTIAL.  Procedure                               Abnormality         Status                     ---------                               -----------         ------                     CBC with Differential[[id number]]       Abnormal             Final result               Manual Differential[[id number]]         Abnormal            Final result                 Please view results for these tests on the individual orders.   BETA - HYDROXYBUTYRATE, SERUM          Imaging Results              CT Abdomen Pelvis  Without Contrast (Final result)  Result time 01/17/25 07:53:50      Final result by Debbie Guerrero III, MD (01/17/25 07:53:50)                   Impression:      1. A moderate amount of fecal residue is noted throughout the colon.  The findings are nonspecific but can be seen with constipation and clinical correlation is recommended.      Electronically signed by: Debbie Guerrero  Date:    01/17/2025  Time:    07:53               Narrative:    EXAMINATION:  CT ABDOMEN PELVIS WITHOUT CONTRAST    CLINICAL HISTORY AND TECHNIQUE:  Flank pain (side not specified)    This patient has had 0 CT and nuclear medicine scans performed within the last 12 months.    The following DOSE REDUCTION TECHNIQUES are used for all CT scans at [facility]:    1. Automated exposure control.  2. Adjustment of the mA and/or kv according to patient size.  3. Use of iterative reconstruction technique.    COMPARISON:  None    FINDINGS:  Liver: No clinically significant abnormalities are noted.    Gallbladder/biliary system: No clinically significant abnormalities are noted.    Spleen: No clinically significant abnormalities are noted.    Adrenal glands: No clinically significant abnormalities are noted.    Pancreas: No clinically significant abnormalities are noted.    Kidneys/ureters: Phleboliths within the pelvis make evaluation of the distal ureters more difficult.  I see no obvious ureteral stones or dilatation of either renal collecting system to suggest high-grade ureteral obstruction.  No renal parenchymal abnormalities are appreciated.    Urinary bladder: The urinary bladder is less than optimally distended with no gross abnormalities noted on  limited visualization.    Uterus and ovaries: No clinically significant abnormalities are appreciated.    GI tract: A moderate amount of fecal residue is noted throughout the colon as can be seen with constipation.  Unopacified loops of large and small bowel as well as the gastric lumen are otherwise difficult to evaluate with no additional abnormalities noted.  The appendix is not clearly visualized although there are no secondary changes to suggest appendicitis.    Vascular structures: No clinically significant abnormalities are noted.    Musculoskeletal structures: No clinically significant abnormalities are noted.    Miscellaneous: N/A                                    X-Rays:   Independently Interpreted Readings:   Other Readings:  CT scan of the abdomen and pelvis shows a moderate amount of fecal residue noted throughout the colon.  Findings are nonspecific but can with constipation.  No pyelonephritis noted.  No renal stone noted.    Medications   sodium chloride 0.9% bolus 1,000 mL 1,000 mL (1,000 mLs Intravenous New Bag 1/17/25 0931)   phenazopyridine tablet 100 mg (has no administration in time range)   ondansetron injection 4 mg (4 mg Intravenous Given 1/17/25 0706)   famotidine (PF) injection 20 mg (20 mg Intravenous Given 1/17/25 0707)   ketorolac injection 30 mg (30 mg Intravenous Given 1/17/25 0707)   sodium chloride 0.9% bolus 1,000 mL 1,000 mL (0 mLs Intravenous Stopped 1/17/25 0803)   cefTRIAXone injection 1 g (1 g Intravenous Given 1/17/25 0931)     Medical Decision Making  This is a 34-year-old female patient came into the emergency room with history of having generalized body pains and more so in the left flank intermittently for last 3 days.  Patient reports pain radiating into the left upper quadrant area of the abdomen.  Reports discomfort in urination.  Reports nausea but no vomitings.  Denies any cough, congestion.  No shortness of breath or chest pain.  Reports having pinkish urine.   Denies any shortness of breath or palpitations.    Differential diagnosis: 1.  Left ureteric stone 2. Diverticulitis 3. Diverticulosis 4.  Pancreatitis 5. UTI 6. Pyelonephritis    CT scan of the abdomen shows constipation.  Patient does have urinary tract infection.  Lactic acid is negative.  Has elevated white count and neutrophil count.  I have recommended this patient hospitalization and IV antibiotics for UTI however patient does not want to be in the hospital.  She prefers has a to get oral antibiotics for UTI.  At her request I am discharging this patient home with oral antibiotics.  I have emphasized that patient needs to come back if symptoms worsen including fever, flank pain, discomfort in urination.  Advised also to get the lab work done again and make sure that her white count is coming down and UTI is getting cleared up.  She understood and agreed to do so.    Amount and/or Complexity of Data Reviewed  Labs: ordered.  Radiology: ordered.    Risk  Prescription drug management.                                      Clinical Impression:  Final diagnoses:  [N30.00] Acute cystitis without hematuria (Primary)  [N39.0] Urinary tract infection without hematuria, site unspecified          ED Disposition Condition    Discharge Stable          ED Prescriptions       Medication Sig Dispense Start Date End Date Auth. Provider    levoFLOXacin (LEVAQUIN) 750 MG tablet Take 1 tablet (750 mg total) by mouth once daily. 7 tablet 1/17/2025 -- Enrique España DO    ketorolac (TORADOL) 10 mg tablet Take 1 tablet (10 mg total) by mouth every 6 (six) hours. for 5 days 20 tablet 1/17/2025 1/22/2025 Enrique España DO    phenazopyridine (PYRIDIUM) 100 MG tablet Take 1 tablet (100 mg total) by mouth 2 (two) times a day. for 10 days 6 tablet 1/17/2025 1/27/2025 Enrique España DO          Follow-up Information       Follow up With Specialties Details Why Contact Info    Follow up with primary doctor                  Enrique España DO  01/17/25 1022

## 2025-01-20 LAB — BACTERIA UR CULT: ABNORMAL

## 2025-01-22 LAB
BACTERIA BLD CULT: NORMAL
BACTERIA BLD CULT: NORMAL